# Patient Record
Sex: FEMALE | Race: WHITE | URBAN - METROPOLITAN AREA
[De-identification: names, ages, dates, MRNs, and addresses within clinical notes are randomized per-mention and may not be internally consistent; named-entity substitution may affect disease eponyms.]

---

## 2017-09-16 ENCOUNTER — HOSPITAL ENCOUNTER (EMERGENCY)
Facility: CLINIC | Age: 32
Discharge: HOME | End: 2017-09-16
Attending: EMERGENCY MEDICINE | Admitting: EMERGENCY MEDICINE
Payer: MEDICARE

## 2017-09-16 VITALS
DIASTOLIC BLOOD PRESSURE: 81 MMHG | WEIGHT: 174.16 LBS | OXYGEN SATURATION: 96 % | TEMPERATURE: 99.6 F | RESPIRATION RATE: 20 BRPM | SYSTOLIC BLOOD PRESSURE: 127 MMHG | HEART RATE: 123 BPM

## 2017-09-16 DIAGNOSIS — D84.1 HEREDITARY ANGIOEDEMA (H): ICD-10-CM

## 2017-09-16 LAB
ABO + RH BLD: NORMAL
ABO + RH BLD: NORMAL
BLD GP AB SCN SERPL QL: NORMAL
BLOOD BANK CMNT PATIENT-IMP: NORMAL
ERYTHROCYTE [DISTWIDTH] IN BLOOD BY AUTOMATED COUNT: 13.4 % (ref 10–15)
HCT VFR BLD AUTO: 36.3 % (ref 35–47)
HGB BLD-MCNC: 12.8 G/DL (ref 11.7–15.7)
MCH RBC QN AUTO: 30.5 PG (ref 26.5–33)
MCHC RBC AUTO-ENTMCNC: 35.3 G/DL (ref 31.5–36.5)
MCV RBC AUTO: 86 FL (ref 78–100)
PLATELET # BLD AUTO: 251 10E9/L (ref 150–450)
RBC # BLD AUTO: 4.2 10E12/L (ref 3.8–5.2)
SPECIMEN EXP DATE BLD: NORMAL
WBC # BLD AUTO: 4.7 10E9/L (ref 4–11)

## 2017-09-16 PROCEDURE — 25000128 H RX IP 250 OP 636: Performed by: EMERGENCY MEDICINE

## 2017-09-16 PROCEDURE — 25000128 H RX IP 250 OP 636

## 2017-09-16 PROCEDURE — 31500 INSERT EMERGENCY AIRWAY: CPT

## 2017-09-16 PROCEDURE — 96374 THER/PROPH/DIAG INJ IV PUSH: CPT | Mod: 59

## 2017-09-16 PROCEDURE — 86901 BLOOD TYPING SEROLOGIC RH(D): CPT | Performed by: EMERGENCY MEDICINE

## 2017-09-16 PROCEDURE — 86900 BLOOD TYPING SEROLOGIC ABO: CPT | Performed by: EMERGENCY MEDICINE

## 2017-09-16 PROCEDURE — 99291 CRITICAL CARE FIRST HOUR: CPT | Mod: 25

## 2017-09-16 PROCEDURE — 85027 COMPLETE CBC AUTOMATED: CPT | Performed by: EMERGENCY MEDICINE

## 2017-09-16 PROCEDURE — 96376 TX/PRO/DX INJ SAME DRUG ADON: CPT

## 2017-09-16 PROCEDURE — 99292 CRITICAL CARE ADDL 30 MIN: CPT

## 2017-09-16 PROCEDURE — 96375 TX/PRO/DX INJ NEW DRUG ADDON: CPT

## 2017-09-16 PROCEDURE — 86850 RBC ANTIBODY SCREEN: CPT | Performed by: EMERGENCY MEDICINE

## 2017-09-16 RX ORDER — ONDANSETRON 2 MG/ML
4 INJECTION INTRAMUSCULAR; INTRAVENOUS ONCE
Status: COMPLETED | OUTPATIENT
Start: 2017-09-16 | End: 2017-09-16

## 2017-09-16 RX ORDER — DIPHENHYDRAMINE HYDROCHLORIDE 50 MG/ML
INJECTION INTRAMUSCULAR; INTRAVENOUS
Status: COMPLETED
Start: 2017-09-16 | End: 2017-09-16

## 2017-09-16 RX ORDER — HYDROMORPHONE HYDROCHLORIDE 1 MG/ML
0.5 INJECTION, SOLUTION INTRAMUSCULAR; INTRAVENOUS; SUBCUTANEOUS ONCE
Status: COMPLETED | OUTPATIENT
Start: 2017-09-16 | End: 2017-09-16

## 2017-09-16 RX ORDER — DIPHENHYDRAMINE HYDROCHLORIDE 50 MG/ML
50 INJECTION INTRAMUSCULAR; INTRAVENOUS ONCE
Status: COMPLETED | OUTPATIENT
Start: 2017-09-16 | End: 2017-09-16

## 2017-09-16 RX ORDER — HYDROMORPHONE HYDROCHLORIDE 1 MG/ML
INJECTION, SOLUTION INTRAMUSCULAR; INTRAVENOUS; SUBCUTANEOUS
Status: COMPLETED
Start: 2017-09-16 | End: 2017-09-16

## 2017-09-16 RX ORDER — ONDANSETRON 2 MG/ML
INJECTION INTRAMUSCULAR; INTRAVENOUS
Status: COMPLETED
Start: 2017-09-16 | End: 2017-09-16

## 2017-09-16 RX ORDER — HEPARIN SODIUM (PORCINE) LOCK FLUSH IV SOLN 100 UNIT/ML 100 UNIT/ML
500 SOLUTION INTRAVENOUS ONCE
Status: COMPLETED | OUTPATIENT
Start: 2017-09-16 | End: 2017-09-16

## 2017-09-16 RX ADMIN — DIPHENHYDRAMINE HYDROCHLORIDE 50 MG: 50 INJECTION INTRAMUSCULAR; INTRAVENOUS at 16:05

## 2017-09-16 RX ADMIN — DIPHENHYDRAMINE HYDROCHLORIDE 50 MG: 50 INJECTION, SOLUTION INTRAMUSCULAR; INTRAVENOUS at 17:30

## 2017-09-16 RX ADMIN — DIPHENHYDRAMINE HYDROCHLORIDE 50 MG: 50 INJECTION INTRAMUSCULAR; INTRAVENOUS at 17:00

## 2017-09-16 RX ADMIN — HYDROMORPHONE HYDROCHLORIDE 0.5 MG: 1 INJECTION, SOLUTION INTRAMUSCULAR; INTRAVENOUS; SUBCUTANEOUS at 16:30

## 2017-09-16 RX ADMIN — DIPHENHYDRAMINE HYDROCHLORIDE 50 MG: 50 INJECTION, SOLUTION INTRAMUSCULAR; INTRAVENOUS at 16:05

## 2017-09-16 RX ADMIN — ECALLANTIDE 30 MG: 10 INJECTION, SOLUTION SUBCUTANEOUS at 16:25

## 2017-09-16 RX ADMIN — HYDROMORPHONE HYDROCHLORIDE 0.5 MG: 1 INJECTION, SOLUTION INTRAMUSCULAR; INTRAVENOUS; SUBCUTANEOUS at 16:18

## 2017-09-16 RX ADMIN — DIPHENHYDRAMINE HYDROCHLORIDE 50 MG: 50 INJECTION INTRAMUSCULAR; INTRAVENOUS at 17:30

## 2017-09-16 RX ADMIN — HUMAN C1-ESTERASE INHIBITOR 1500 UNITS: KIT at 16:19

## 2017-09-16 RX ADMIN — DIPHENHYDRAMINE HYDROCHLORIDE 50 MG: 50 INJECTION, SOLUTION INTRAMUSCULAR; INTRAVENOUS at 17:00

## 2017-09-16 RX ADMIN — ONDANSETRON 4 MG: 2 SOLUTION INTRAMUSCULAR; INTRAVENOUS at 16:05

## 2017-09-16 RX ADMIN — ECALLANTIDE 30 MG: 10 INJECTION, SOLUTION SUBCUTANEOUS at 17:03

## 2017-09-16 RX ADMIN — ONDANSETRON 4 MG: 2 INJECTION INTRAMUSCULAR; INTRAVENOUS at 16:05

## 2017-09-16 RX ADMIN — SODIUM CHLORIDE, PRESERVATIVE FREE 500 UNITS: 5 INJECTION INTRAVENOUS at 19:09

## 2017-09-16 ASSESSMENT — ENCOUNTER SYMPTOMS
SHORTNESS OF BREATH: 0
TROUBLE SWALLOWING: 0
ABDOMINAL DISTENTION: 1
ABDOMINAL PAIN: 1
NAUSEA: 1

## 2017-09-16 NOTE — ED PROVIDER NOTES
History     Chief Complaint:  Oral Swelling     HPI:  Brandi Hook is a 32 year old female who presents with oral swelling. The patient's fiance reports that the patient has a long history of type III angioedema with flares occurring about twice per week. For this, she has a port in place and uses Berinert and Kalbitor for treatment, occasionally requiring FFP. However, this rescue medication regimen works only about 50% of the time, and she has previously required intubation.     Today, the patient and her fiance were in town at the angioedema conference. About an hour prior to arrival, the patient began experiencing swelling to her tongue and throat. In addition, her stomach became distended, consistent with her previous episodes of angioedema. As this was acutely worsening, EMS was called. When she got in the ambulance, she was no longer able to speak secondary to the oral swelling. Here in the ED, the patient is able to communicate by typing on her phone. She is also complaining of nausea and abdominal pain, but denies any trouble swallowing or difficulties breathing. Overall, she notes that the throat and tongue swelling is staying the same at this time, but her abdominal distention is slowly worsening.     Allergies:  Bee Venom  Levaquin [Levofloxacin]  Mucinex  Nsaids  Prednisone  Solu-Medrol [Methylprednisolone]  Sulfa Drugs      Medications:    Benadryl  Berinert  Kalbitor      Past Medical History:    Angioedema    Past Surgical History:    History reviewed. No pertinent surgical history.     Family History:    History reviewed. No pertinent family history.      Social History:  Marital Status:  Single  Presents with fiance at bedside.      Review of Systems   HENT: Negative for trouble swallowing.         Tongue and throat swelling   Respiratory: Negative for shortness of breath.    Gastrointestinal: Positive for abdominal distention, abdominal pain and nausea.   All other systems reviewed and are  negative.    10 point review of systems performed and is negative except as above and in HPI.     Physical Exam     Patient Vitals for the past 24 hrs:   BP Temp Temp src Pulse Heart Rate Resp SpO2 Weight   09/16/17 1830 141/84 - - - 101 - 97 % -   09/16/17 1820 - - - - 105 - 97 % -   09/16/17 1803 - - - - - 22 - -   09/16/17 1802 - - - - 92 - 100 % -   09/16/17 1800 (!) 131/92 - - - 98 (!) 34 - -   09/16/17 1749 - - - - 101 14 99 % -   09/16/17 1745 142/86 - - - 112 27 97 % -   09/16/17 1730 139/84 - - - 118 23 100 % -   09/16/17 1715 133/82 - - - 101 13 98 % -   09/16/17 1700 (!) 147/103 - - - 105 16 100 % -   09/16/17 1645 (!) 144/106 - - - 112 23 - -   09/16/17 1644 - - - - 113 16 99 % -   09/16/17 1630 (!) 153/101 - - - 128 (!) 32 100 % -   09/16/17 1626 (!) 153/113 - - - 121 16 100 % -   09/16/17 1615 (!) 153/92 - - - 124 25 100 % -   09/16/17 1614 - - - - 128 28 100 % -   09/16/17 1613 169/87 - - - 124 17 100 % -   09/16/17 1608 (!) 191/103 99.6  F (37.6  C) Temporal 123 - 24 99 % 79 kg (174 lb 2.6 oz)   09/16/17 1606 - - - - 146 (!) 34 100 % -   09/16/17 1600 - - - - 113 16 99 % -      Physical Exam:  General:  Resting on gurney, appears uncomfortable.  Head:  The scalp, face, and head appear normal  Eyes:  The pupils are equal, round, and reactive to light    Conjunctivae normal  ENT:    The nose is normal    Ears/pinnae are normal    External acoustic canals are normal    Tympanic membranes are normal    Profound tongue swelling, with s=extraoral protrusion.,  Unable to visualize oropharynx pther than tongue.  Neck:  Normal range of motion.      There is no rigidity.  No meningismus.    Trachea is in the midline and normal.      No mass detected.    CV:  Regular rate    Normal S1 and S2    No S3 or S4    No pathological murmur   Resp:  No stridor. No respiratory distress.    Lungs are clear.      There is no tachypnea; Non-labored    No rales    No wheezing   GI:  Abdomen is distended and tender to  palpation.       No tympani. No rebound tenderness.     Non-surgical without peritoneal features.    RUQ:    Normal    Epigastrium:  Normal    LUQ:   Normal    RLQ:   Normal    Suprapubic:  Normal    LLQ:   Normal  MS:  Normal muscular tone.      No major joint effusions.      Normal motor assessment of all extremities.  Skin:  No rash or lesions noted.  No petechiae or purpura.  Neuro: Speech is normal and age appropriate    No focal neurological deficits detected  Psych:  Awake. Alert. Appropriate interactions.      Emergency Department Course     Laboratory:  CBC: WNL (WBC 4.7, HGB 12.8, )    ABO/Rh: A Negative    Interventions:  1605- Benadryl 50 mg IV  1605- Zofran 4 mg IV  1618- Dilaudid 0.5 mg IV  1619- Berinert KIT 1,500 units IV  1625- Kalbitor 30 mg Subcutaneous  1630- Dilaudid 0.5 mg IV  1700- Benadryl 50 mg IV  1703- Kalbitor 30 mg Subcutaneous  1730- Benadryl 50 mg IV    The patient's symptoms were improved with parenteral narcotics.    Emergency Department Course:  15:56:25: Patient arrives via EMS. Past medical records, nursing notes, and vitals reviewed. I performed an exam of the patient and obtained history, as documented above.     The patient's port was accessed and blood was drawn.    The patient was placed on continuous cardiac monitoring and pulse oximetry.     15:59:41: A nasal trumpet placement was attempted, but was unsuccessful.    The above interventions were administered.    16:11:38: Oxygen mask placed.    I continued to observe the patient in the room until 1632.    1640: I rechecked the patient.     1651: I rechecked the patient.     1707: I rechecked the patient.     1728: I rechecked the patient.     1852: I rechecked the patient. Findings and plan explained to the Patient and significant other. Patient discharged home with instructions regarding supportive care, medications, and reasons to return. The importance of close follow-up was reviewed.      Impression & Plan       Medical Decision Making:  Brandi Hook is a 32 year old female who presents for evaluation of oral and abdominal swelling.  Signs and symptoms are consistent with her previous flares of her hereditary angioedema. She was in town for an angioedema conference when she began experiencing tongue, throat, and abdominal swelling. We administered the patient's normal treatment regimen as noted above. The patient was observed very closely here in the emergency department for signs of compromised airway or anaphylactic shock. Intubation was not indicated as the patient improved after the above medications.  Based on this will not hospitalize patient at this time. Return precautions for angioedema or anaphylactic symptoms were discussed with patient and they were instructed to call 911 should these symptoms occur.      Critical Care time:  was 95 minutes for this patient excluding procedures.    Diagnosis:    ICD-10-CM    1. Hereditary angioedema (H) D84.1      Disposition:  Discharged to home.    Esperanza Covarrubias  9/16/2017    EMERGENCY DEPARTMENT    IEsperanza am serving as a scribe at 3:56 PM on 9/16/2017 to document services personally performed by Robyn Gipson MD based on my observations and the provider's statements to me.       Robyn Gipson MD  09/21/17 1257

## 2017-09-16 NOTE — ED NOTES
Bed: ST01  Expected date: 9/16/17  Expected time: 3:51 PM  Means of arrival: Ambulance  Comments:  516 32f angioedema, throat and tongue swelling

## 2017-09-16 NOTE — ED NOTES
Bed: ED04  Expected date: 9/16/17  Expected time: 6:01 PM  Means of arrival:   Comments:  GLENROY 1

## 2017-09-16 NOTE — ED AVS SNAPSHOT
Emergency Department    [address] [place] [address]    Phone:  [phone]    Fax:  [phone]                                       Brandi Hook   MRN: [id number]    Department:   Emergency Department   Date of Visit:  9/16/2017           After Visit Summary Signature Page     I have received my discharge instructions, and my questions have been answered. I have discussed any challenges I see with this plan with the nurse or doctor.    ..........................................................................................................................................  Patient/Patient Representative Signature      ..........................................................................................................................................  Patient Representative Print Name and Relationship to Patient    ..................................................               ................................................  Date                                            Time    ..........................................................................................................................................  Reviewed by Signature/Title    ...................................................              ..............................................  Date                                                            Time

## 2017-09-16 NOTE — ED AVS SNAPSHOT
Emergency Department    [address] [facility] [address]    Phone:  [phone]    Fax:  [phone]                                       Brandi Hook   MRN: [id number]    Department:   Emergency Department   Date of Visit:  9/16/2017           Patient Information     Date Of Birth          1985        Your diagnoses for this visit were:     Hereditary angioedema (H)        You were seen by Robyn Gipson MD.      Follow-up Information     Follow up with your primary doctor. Call in 2 days.        Follow up with  Emergency Department.    Specialty:  EMERGENCY MEDICINE    Why:  As needed, If symptoms worsen    Contact information:    [address] [facility] [address] [phone]        Discharge Instructions       Return with worsening or worrisome symptoms.  We are happy to see you for any of your needs while you are in town.      If you feel your condition has changed or worsened, please call your doctor or return to the emergency department right away.      24 Hour Appointment Hotline       To make an appointment at any [facility], call [phone] ([phone]). If you don't have a family doctor or clinic, we will help you find one. [facility] are conveniently located to serve the needs of you and your family.             Review of your medicines      Notice     You have not been prescribed any medications.            Procedures and tests performed during your visit     ABO/Rh type and screen    CBC (+ platelets, no diff)      Orders Needing Specimen Collection     None      Pending Results     No orders found from 9/14/2017 to 9/17/2017.            Pending Culture Results     No orders found from 9/14/2017 to 9/17/2017.            Pending Results Instructions     If you had any lab results that were not finalized at the time of your Discharge, you can call the ED Lab Result RN at [phone]. You will be contacted by this team for any  positive Lab results or changes in treatment. The nurses are available 7 days a week from 10A to 6:30P.  You can leave a message 24 hours per day and they will return your call.        Test Results From Your Hospital Stay        9/16/2017  4:24 PM      Component Results     Component Value Ref Range & Units Status    WBC 4.7 4.0 - 11.0 10e9/L Final    RBC Count 4.20 3.8 - 5.2 10e12/L Final    Hemoglobin 12.8 11.7 - 15.7 g/dL Final    Hematocrit 36.3 35.0 - 47.0 % Final    MCV 86 78 - 100 fl Final    MCH 30.5 26.5 - 33.0 pg Final    MCHC 35.3 31.5 - 36.5 g/dL Final    RDW 13.4 10.0 - 15.0 % Final    Platelet Count 251 150 - 450 10e9/L Final         9/16/2017  5:03 PM      Component Results     Component Value Ref Range & Units Status    ABO A  Final    RH(D) Pos  Final    Antibody Screen Neg  Final    Test Valid Only At [facility]     Final    Specimen Expires 09/19/2017  Final                Clinical Quality Measure: Blood Pressure Screening     Your blood pressure was checked while you were in the emergency department today. The last reading we obtained was  BP: 141/84 . Please read the guidelines below about what these numbers mean and what you should do about them.  If your systolic blood pressure (the top number) is less than 120 and your diastolic blood pressure (the bottom number) is less than 80, then your blood pressure is normal. There is nothing more that you need to do about it.  If your systolic blood pressure (the top number) is 120-139 or your diastolic blood pressure (the bottom number) is 80-89, your blood pressure may be higher than it should be. You should have your blood pressure rechecked within a year by a primary care provider.  If your systolic blood pressure (the top number) is 140 or greater or your diastolic blood pressure (the bottom number) is 90 or greater, you may have high blood pressure. High blood pressure is treatable, but if left untreated over time it can put you  "at risk for heart attack, stroke, or kidney failure. You should have your blood pressure rechecked by a primary care provider within the next 4 weeks.  If your provider in the emergency department today gave you specific instructions to follow-up with your doctor or provider even sooner than that, you should follow that instruction and not wait for up to 4 weeks for your follow-up visit.        Thank you for choosing [facility]       Thank you for choosing [facility] for your care. Our goal is always to provide you with excellent care. Hearing back from our patients is one way we can continue to improve our services. Please take a few minutes to complete the written survey that you may receive in the mail after you visit with us. Thank you!        [email or web address] Information     [employer] lets you send messages to your doctor, view your test results, renew your prescriptions, schedule appointments and more. To sign up, go to [email or web address] . Click on \"Log in\" on the left side of the screen, which will take you to the Welcome page. Then click on \"Sign up Now\" on the right side of the page.     You will be asked to enter the access code listed below, as well as some personal information. Please follow the directions to create your username and password.     Your access code is: [email or web address]  Expires: 12/15/2017  7:04 PM     Your access code will  in 90 days. If you need help or a new code, please call your [facility] clinic or [phone].        Care EveryWhere ID     This is your Care EveryWhere ID. This could be used by other organizations to access your [facility] medical records  [id number]        Equal Access to Services     LOLA MARTELL : Hadii chalo shrestha Sosheeba, waaxda luqadaha, qaybta kaalmatonio hurt . So [facility] [phone].    ATENCIÓN: Si habla español, tiene a elizabeth disposición servicios gratuitos de asistencia lingüística. Llame al [phone].    We comply " with applicable federal civil rights laws and [place] laws. We do not discriminate on the basis of race, color, national origin, age, disability sex, sexual orientation or gender identity.            After Visit Summary       This is your record. Keep this with you and show to your community pharmacist(s) and doctor(s) at your next visit.

## 2017-09-16 NOTE — DISCHARGE INSTRUCTIONS
Return with worsening or worrisome symptoms.  We are happy to see you for any of your needs while you are in town.      If you feel your condition has changed or worsened, please call your doctor or return to the emergency department right away.